# Patient Record
Sex: MALE | Race: WHITE | Employment: UNEMPLOYED
[De-identification: names, ages, dates, MRNs, and addresses within clinical notes are randomized per-mention and may not be internally consistent; named-entity substitution may affect disease eponyms.]

---

## 2017-01-13 ENCOUNTER — TELEPHONE (OUTPATIENT)
Dept: NEUROLOGY | Facility: CLINIC | Age: 62
End: 2017-01-13

## 2017-06-22 ENCOUNTER — OFFICE VISIT (OUTPATIENT)
Dept: NEUROLOGY | Age: 62
End: 2017-06-22
Payer: COMMERCIAL

## 2017-06-22 VITALS
HEIGHT: 69 IN | HEART RATE: 59 BPM | DIASTOLIC BLOOD PRESSURE: 79 MMHG | WEIGHT: 234 LBS | BODY MASS INDEX: 34.66 KG/M2 | SYSTOLIC BLOOD PRESSURE: 125 MMHG

## 2017-06-22 DIAGNOSIS — M79.2 PERIPHERAL NEUROPATHIC PAIN: Primary | ICD-10-CM

## 2017-06-22 PROCEDURE — 99214 OFFICE O/P EST MOD 30 MIN: CPT | Performed by: PSYCHIATRY & NEUROLOGY

## 2017-06-29 RX ORDER — PREGABALIN 100 MG/1
100 CAPSULE ORAL 3 TIMES DAILY
Qty: 270 CAPSULE | Refills: 3 | OUTPATIENT
Start: 2017-06-29 | End: 2018-01-17 | Stop reason: SDUPTHER

## 2017-09-25 RX ORDER — ROPINIROLE 0.25 MG/1
0.25 TABLET, FILM COATED ORAL NIGHTLY
Qty: 90 TABLET | Refills: 0 | Status: SHIPPED | OUTPATIENT
Start: 2017-09-25 | End: 2017-12-21 | Stop reason: SDUPTHER

## 2017-11-01 ENCOUNTER — HOSPITAL ENCOUNTER (OUTPATIENT)
Age: 62
Discharge: HOME | End: 2017-11-01
Payer: COMMERCIAL

## 2017-11-01 ENCOUNTER — HOSPITAL ENCOUNTER (OUTPATIENT)
Dept: GENERAL RADIOLOGY | Age: 62
Discharge: HOME | End: 2017-11-01
Payer: COMMERCIAL

## 2017-11-01 DIAGNOSIS — M25.552 PAIN IN JOINT INVOLVING LEFT PELVIC REGION AND THIGH: ICD-10-CM

## 2017-11-01 LAB
ALBUMIN SERPL-MCNC: 4.5 G/DL (ref 3.5–5.2)
ALBUMIN/GLOBULIN RATIO: ABNORMAL (ref 1–2.5)
ALP BLD-CCNC: 50 U/L (ref 40–129)
ALT SERPL-CCNC: 26 U/L (ref 5–41)
ANION GAP SERPL CALCULATED.3IONS-SCNC: 13 MMOL/L (ref 9–17)
AST SERPL-CCNC: 24 U/L
BILIRUB SERPL-MCNC: 0.55 MG/DL (ref 0.3–1.2)
BUN BLDV-MCNC: 14 MG/DL (ref 8–23)
BUN/CREAT BLD: 17 (ref 9–20)
CALCIUM SERPL-MCNC: 9.6 MG/DL (ref 8.6–10.4)
CHLORIDE BLD-SCNC: 100 MMOL/L (ref 98–107)
CHOLESTEROL/HDL RATIO: 3.7
CHOLESTEROL: 144 MG/DL
CO2: 25 MMOL/L (ref 20–31)
CREAT SERPL-MCNC: 0.82 MG/DL (ref 0.7–1.2)
GFR AFRICAN AMERICAN: >60 ML/MIN
GFR NON-AFRICAN AMERICAN: >60 ML/MIN
GFR SERPL CREATININE-BSD FRML MDRD: ABNORMAL ML/MIN/{1.73_M2}
GFR SERPL CREATININE-BSD FRML MDRD: ABNORMAL ML/MIN/{1.73_M2}
GLUCOSE BLD-MCNC: 102 MG/DL (ref 70–99)
HDLC SERPL-MCNC: 39 MG/DL
LDL CHOLESTEROL: 75 MG/DL (ref 0–130)
POTASSIUM SERPL-SCNC: 4.4 MMOL/L (ref 3.7–5.3)
PROSTATE SPECIFIC ANTIGEN: 1.97 UG/L
SODIUM BLD-SCNC: 138 MMOL/L (ref 135–144)
TOTAL PROTEIN: 6.8 G/DL (ref 6.4–8.3)
TRIGL SERPL-MCNC: 148 MG/DL
VLDLC SERPL CALC-MCNC: ABNORMAL MG/DL (ref 1–30)

## 2017-11-01 PROCEDURE — 73502 X-RAY EXAM HIP UNI 2-3 VIEWS: CPT

## 2017-11-01 PROCEDURE — 36415 COLL VENOUS BLD VENIPUNCTURE: CPT

## 2017-11-01 PROCEDURE — 80053 COMPREHEN METABOLIC PANEL: CPT

## 2017-11-01 PROCEDURE — 84153 ASSAY OF PSA TOTAL: CPT

## 2017-11-01 PROCEDURE — 80061 LIPID PANEL: CPT

## 2017-12-21 RX ORDER — ROPINIROLE 0.25 MG/1
0.25 TABLET, FILM COATED ORAL NIGHTLY
Qty: 90 TABLET | Refills: 0 | Status: SHIPPED | OUTPATIENT
Start: 2017-12-21 | End: 2018-01-10 | Stop reason: SDUPTHER

## 2018-01-10 ENCOUNTER — TELEPHONE (OUTPATIENT)
Dept: NEUROLOGY | Age: 63
End: 2018-01-10

## 2018-01-10 ENCOUNTER — OFFICE VISIT (OUTPATIENT)
Dept: NEUROLOGY | Age: 63
End: 2018-01-10
Payer: COMMERCIAL

## 2018-01-10 VITALS
HEART RATE: 92 BPM | HEIGHT: 70 IN | WEIGHT: 250.6 LBS | BODY MASS INDEX: 35.88 KG/M2 | DIASTOLIC BLOOD PRESSURE: 80 MMHG | SYSTOLIC BLOOD PRESSURE: 132 MMHG

## 2018-01-10 DIAGNOSIS — G62.9 NEUROPATHY: Primary | ICD-10-CM

## 2018-01-10 PROCEDURE — 99214 OFFICE O/P EST MOD 30 MIN: CPT | Performed by: NURSE PRACTITIONER

## 2018-01-10 RX ORDER — ROPINIROLE 0.25 MG/1
0.25 TABLET, FILM COATED ORAL NIGHTLY
Qty: 90 TABLET | Refills: 3 | Status: SHIPPED | OUTPATIENT
Start: 2018-01-10 | End: 2019-01-09 | Stop reason: SDUPTHER

## 2018-01-10 NOTE — LETTER
January 10, 2018     Cecilia Pereyra MD   [address]                         [phone] [facility]    Patient: Yaniv Nielsen  MR Number: [id number]  YOB: 1955  Date of Visit: 1/10/2018    Dear Dr. Cecilia Pereyra:        [facility]  [facility], [address], [address] [place] [address]  Dept: [phone]  Dept Fax: [phone]                            MD Pedro Shirley MD Elder Cower, MD Ahmed B. Rama Born, MD Smitty Budd, MD Gwendolyn Anger, CNP      1/10/2018    HPI:      Your patient, Yaniv Nielsen returns for continuing neurologic care. Patient is a 73-year-old man who is seen in the past by Noreen Bazzi for idiopathic peripheral neuropathy and restless leg syndrome. His last visit was on June 22, 2017. 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 There was also a right median neuropathy, indicating a right carpal tunnel syndrome. Previous testing also included TSH 2.32, vitamin B12 903,  and hemoglobin A1c 5.3. Patient also had a nerve fiber density test which was abnormal.  Patient is currently treated with Lyrica 100 mg 3 times daily and Requip 0.25 mg at bedtime. Patient returns today in follow-up for his peripheral neuropathy. He reports adequate control with his current dose of Lyrica as well as the Requip. Patient's symptoms typically are in his feet and ascended to his ankle and are described as painful numbness and tingling.     Past Medical History:   Diagnosis Date    Carpal tunnel syndrome     Hyperlipidemia     Hypertension     Neuropathy (HCC)     Osteoarthritis     left hip          Past Surgical History:   Procedure Laterality Date    CARPAL TUNNEL RELEASE  atenolol (TENORMIN) 25 MG tablet Take 25 mg by mouth daily      atorvastatin (LIPITOR) 10 MG tablet Take 10 mg by mouth daily. No current facility-administered medications for this visit. PHYSICAL EXAMINATION       There were no vitals filed for this visit. .                                                                                                    General Appearance:  Alert, cooperative, no signs of distress, appears stated age   Head:  Normocephalic, no signs of trauma   Eyes:  Conjunctiva/corneas clear;  eyelids intact   Ears:  Normal external ear and canals   Nose: Nares normal, mucosa normal, no drainage    Throat: Lips and tongue normal; teeth normal;  gums normal   Neck: Supple, intact flexion, extension and rotation;   trachea midline;  no adenopathy;   thyroid: not enlarged;   no carotid pulse abnormality   Back:   Symmetric, no curvature, ROM adequate   Lungs:   Respirations unlabored   Heart:  Regular rate and rhythm           Extremities: Extremities normal, no cyanosis, no edema   Pulses: Symmetric over head and neck   Skin: Skin color, texture normal, no rashes, no lesions                                             NEUROLOGIC EXAMINATION    Neurologic Exam     Mental Status   Oriented to person, place, and time. Attention: normal.   Speech: speech is normal   Level of consciousness: alert  Normal comprehension. Cranial Nerves     CN II   Visual fields full to confrontation. CN III, IV, VI   Pupils are equal, round, and reactive to light. Extraocular motions are normal.     CN V   Facial sensation intact. CN VII   Facial expression full, symmetric.      CN VIII   CN VIII normal.     CN IX, X   CN IX normal.     CN XI   CN XI normal.     CN XII   CN XII normal.     Motor Exam   Muscle bulk: normal  Overall muscle tone: normal  Right arm tone: normal  Left arm tone: normal

## 2018-01-10 NOTE — PROGRESS NOTES
Substance Use Topics    Smoking status: Never Smoker    Smokeless tobacco: Never Used    Alcohol use 0.0 oz/week      Comment: rarely                               REVIEW OF SYSTEMS    CONSTITUTIONAL Weight: absent, Appetite: absent, Fatigue: absent      HEENT Ears: ringing, Visual disturbance: absent   RESPIRATORY Shortness of breath: absent, Cough: absent   CARDIOVASCULAR Chest pain: absent, Leg swelling :absent      GI Constipation: absent, Diarrhea: absent, Swallowing change: absent       Urinary frequency: absent, Urinary urgency: absent, Urinary incontinence: absent   MUSCULOSKELETAL Neck pain: absent, Back pain: absent, Stiffness: absent, Muscle pain: absent, Joint pain: present Restless legs: absent   DERMATOLOGIC Hair loss: absent, Skin changes: absent   NEUROLOGIC Memory loss: absent, Confusion: absent, Seizures: absent Trouble walking or imbalance: present, Dizziness: absent, Weakness: absent, Numbness: absent Tremor: absent, Spasm: absent, Speech difficulty: absent, Headache: absent, Light sensitivity: absent   PSYCHIATRIC Anxiety: absent, Hallucination: absent, Mood disorder: absent   HEMATOLOGIC Abnormal bleeding: absent, Anemia: absent, Clotting disorder: absent, Lymph gland changes: absent           No Known Allergies        Current Outpatient Prescriptions   Medication Sig Dispense Refill    rOPINIRole (REQUIP) 0.25 MG tablet TAKE 1 TABLET BY MOUTH NIGHTLY 90 tablet 0    pregabalin (LYRICA) 100 MG capsule Take 1 capsule by mouth 3 times daily 270 capsule 3    HYDROcodone-acetaminophen (NORCO) 5-325 MG per tablet Take 1 tablet by mouth as needed      niacin 500 MG CR capsule Take 500 mg by mouth nightly      atenolol (TENORMIN) 25 MG tablet Take 25 mg by mouth daily      atorvastatin (LIPITOR) 10 MG tablet Take 10 mg by mouth daily. No current facility-administered medications for this visit.                                          PHYSICAL EXAMINATION       There were no vitals filed for this visit. .                                                                                                    General Appearance:  Alert, cooperative, no signs of distress, appears stated age   Head:  Normocephalic, no signs of trauma   Eyes:  Conjunctiva/corneas clear;  eyelids intact   Ears:  Normal external ear and canals   Nose: Nares normal, mucosa normal, no drainage    Throat: Lips and tongue normal; teeth normal;  gums normal   Neck: Supple, intact flexion, extension and rotation;   trachea midline;  no adenopathy;   thyroid: not enlarged;   no carotid pulse abnormality   Back:   Symmetric, no curvature, ROM adequate   Lungs:   Respirations unlabored   Heart:  Regular rate and rhythm           Extremities: Extremities normal, no cyanosis, no edema   Pulses: Symmetric over head and neck   Skin: Skin color, texture normal, no rashes, no lesions                                             NEUROLOGIC EXAMINATION    Neurologic Exam     Mental Status   Oriented to person, place, and time. Attention: normal.   Speech: speech is normal   Level of consciousness: alert  Normal comprehension. Cranial Nerves     CN II   Visual fields full to confrontation. CN III, IV, VI   Pupils are equal, round, and reactive to light. Extraocular motions are normal.     CN V   Facial sensation intact. CN VII   Facial expression full, symmetric. CN VIII   CN VIII normal.     CN IX, X   CN IX normal.     CN XI   CN XI normal.     CN XII   CN XII normal.     Motor Exam   Muscle bulk: normal  Overall muscle tone: normal  Right arm tone: normal  Left arm tone: normal  Right arm pronator drift: absent  Left arm pronator drift: absent  Right leg tone: normal  Left leg tone: normal    Strength   Strength 5/5 throughout.      Sensory Exam   Right arm light touch: normal  Left arm light touch: normal  Right leg light touch: decreased from ankle  Left leg light touch:

## 2018-01-17 DIAGNOSIS — G62.9 NEUROPATHY: Primary | ICD-10-CM

## 2018-01-17 RX ORDER — PREGABALIN 100 MG/1
100 CAPSULE ORAL 3 TIMES DAILY
Qty: 270 CAPSULE | Refills: 1 | Status: SHIPPED | OUTPATIENT
Start: 2018-01-17 | End: 2018-01-22 | Stop reason: SDUPTHER

## 2018-01-22 DIAGNOSIS — G62.9 NEUROPATHY: ICD-10-CM

## 2018-01-22 RX ORDER — PREGABALIN 100 MG/1
100 CAPSULE ORAL 3 TIMES DAILY
Qty: 270 CAPSULE | Refills: 1 | Status: SHIPPED | OUTPATIENT
Start: 2018-01-22 | End: 2018-05-31 | Stop reason: SDUPTHER

## 2018-02-21 ENCOUNTER — TELEPHONE (OUTPATIENT)
Dept: NEUROLOGY | Age: 63
End: 2018-02-21

## 2018-03-28 RX ORDER — ROPINIROLE 0.25 MG/1
0.25 TABLET, FILM COATED ORAL NIGHTLY
Qty: 90 TABLET | Refills: 3 | Status: CANCELLED | OUTPATIENT
Start: 2018-03-28 | End: 2018-06-26

## 2018-03-28 NOTE — TELEPHONE ENCOUNTER
We received a faxed request from [facility] pharmacy in [facility], for a refill on Shon's Requip. . This is not the pharmacy listed in his chart. A call was placed to Shon to check on this. He told me that he no longer uses this pharmacy, He has changed to [facility], which is the one in his chart. His next appointment is 7/10/2018.

## 2018-05-29 DIAGNOSIS — G62.9 NEUROPATHY: ICD-10-CM

## 2018-05-31 RX ORDER — PREGABALIN 100 MG/1
100 CAPSULE ORAL 3 TIMES DAILY
Qty: 270 CAPSULE | Refills: 1 | Status: SHIPPED | OUTPATIENT
Start: 2018-05-31 | End: 2018-12-07 | Stop reason: SDUPTHER

## 2018-07-10 ENCOUNTER — OFFICE VISIT (OUTPATIENT)
Dept: NEUROLOGY | Age: 63
End: 2018-07-10
Payer: COMMERCIAL

## 2018-07-10 VITALS
HEART RATE: 67 BPM | SYSTOLIC BLOOD PRESSURE: 130 MMHG | WEIGHT: 243.3 LBS | DIASTOLIC BLOOD PRESSURE: 82 MMHG | BODY MASS INDEX: 34.83 KG/M2 | HEIGHT: 70 IN

## 2018-07-10 DIAGNOSIS — G25.81 RESTLESS LEG SYNDROME: ICD-10-CM

## 2018-07-10 DIAGNOSIS — G62.9 NEUROPATHY: Primary | ICD-10-CM

## 2018-07-10 PROCEDURE — 99214 OFFICE O/P EST MOD 30 MIN: CPT | Performed by: NURSE PRACTITIONER

## 2018-07-10 NOTE — PROGRESS NOTES
per tablet Take 1 tablet by mouth as needed      niacin 500 MG CR capsule Take 500 mg by mouth nightly      atenolol (TENORMIN) 25 MG tablet Take 25 mg by mouth daily      atorvastatin (LIPITOR) 10 MG tablet Take 10 mg by mouth daily. No current facility-administered medications for this visit. PHYSICAL EXAMINATION       /82 (Site: Left Arm, Position: Sitting) Comment: retake  Pulse 67   Ht 5' 10\" (1.778 m)   Wt 243 lb 4.8 oz (110.4 kg)   BMI 34.91 kg/m²                                             . General Appearance:  Alert, cooperative, no signs of distress, appears stated age   Head:  Normocephalic, no signs of trauma   Eyes:  Conjunctiva/corneas clear;  eyelids intact   Ears:  Normal external ear and canals   Nose: Nares normal, mucosa normal, no drainage    Throat: Lips and tongue normal; teeth normal;  gums normal   Neck: Supple, intact flexion, extension and rotation;   trachea midline;  no adenopathy;   thyroid: not enlarged;   no carotid pulse abnormality   Back:   Symmetric, no curvature, ROM adequate   Lungs:   Respirations unlabored   Heart:  Regular rate and rhythm           Extremities: Extremities normal, no cyanosis, no edema   Pulses: Symmetric over head and neck   Skin: Skin color, texture normal, no rashes, no lesions                                             NEUROLOGIC EXAMINATION    Neurologic Exam     Mental Status   Oriented to person, place, and time. Attention: normal.   Speech: speech is normal   Level of consciousness: alert  Normal comprehension. Cranial Nerves     CN II   Visual fields full to confrontation. CN III, IV, VI   Pupils are equal, round, and reactive to light. Extraocular motions are normal.     CN V   Facial sensation intact. CN VII   Facial expression full, symmetric.      CN VIII   CN VIII normal. CN IX, X   CN IX normal.     CN XI   CN XI normal.     CN XII   CN XII normal.     Motor Exam   Muscle bulk: normal  Overall muscle tone: normal  Right arm tone: normal  Left arm tone: normal  Right arm pronator drift: absent  Left arm pronator drift: absent  Right leg tone: normal  Left leg tone: normal    Strength   Strength 5/5 throughout. Sensory Exam   Right leg light touch: decreased from knee  Left leg light touch: decreased from knee  Right leg vibration: decreased from knee  Left leg vibration: decreased from knee  Right leg pinprick: decreased from knee  Left leg pinprick: decreased from knee    Gait, Coordination, and Reflexes     Gait  Gait: normal    Coordination   Finger to nose coordination: normal    Tremor   Resting tremor: absent    Reflexes   Right brachioradialis: 2+  Left brachioradialis: 2+  Right biceps: 2+  Left biceps: 2+  Right triceps: 2+  Left triceps: 2+  Right patellar: 0  Left patellar: 0  Right achilles: 0  Left achilles: 0            ASSESSMENT/PLAN:       In summary, your patient, Santa Book exhibits the following, with associated plan:    1. Idiopathic axonal sensorimotor peripheral neuropathy  1. Continue Lyrica 100 mg 3 times daily  2. Patient was advised that if the pain in his right foot is persistent, that we could either increase his Lyrica, we'll pursue further diagnostic studies such as an EMG/NCV study of his right leg. 3. He will otherwise return in 6 months  2. Restless leg syndrome  1.  Continue Requip 0.5 mg at bedtime daily            Signed: Zev German CNP      *Please note that portions of this note were completed with a voice recognition program.  Although every effort was made to insure the accuracy of this automated transcription, some errors in transcription may have occurred, occasionally words and are mis-transcribed

## 2018-09-12 ENCOUNTER — TELEPHONE (OUTPATIENT)
Dept: NEUROLOGY | Age: 63
End: 2018-09-12

## 2018-12-07 DIAGNOSIS — G62.9 NEUROPATHY: ICD-10-CM

## 2018-12-07 NOTE — TELEPHONE ENCOUNTER
Kenia Angeles called in. He is down to 4 week supply of Lyrica that he gets through pt. assistance. He asked if we can send them a RX. I told him we can try. I see his approval for pt. assistance is good until 12/31/18. He will need ot fill out a new form for after Jan 1. I told him we can mail it out to him because he doesn't have a printer.

## 2018-12-10 RX ORDER — PREGABALIN 100 MG/1
100 CAPSULE ORAL 3 TIMES DAILY
Qty: 270 CAPSULE | Refills: 1 | Status: SHIPPED | OUTPATIENT
Start: 2018-12-10 | End: 2019-06-05 | Stop reason: SDUPTHER

## 2019-01-09 ENCOUNTER — OFFICE VISIT (OUTPATIENT)
Dept: NEUROLOGY | Age: 64
End: 2019-01-09
Payer: COMMERCIAL

## 2019-01-09 VITALS
BODY MASS INDEX: 39.25 KG/M2 | DIASTOLIC BLOOD PRESSURE: 80 MMHG | HEART RATE: 78 BPM | SYSTOLIC BLOOD PRESSURE: 132 MMHG | WEIGHT: 265 LBS | HEIGHT: 69 IN

## 2019-01-09 DIAGNOSIS — G25.81 RESTLESS LEG SYNDROME: ICD-10-CM

## 2019-01-09 DIAGNOSIS — G62.9 NEUROPATHY: Primary | ICD-10-CM

## 2019-01-09 PROCEDURE — 99214 OFFICE O/P EST MOD 30 MIN: CPT | Performed by: NURSE PRACTITIONER

## 2019-01-09 RX ORDER — TIZANIDINE 2 MG/1
2 TABLET ORAL NIGHTLY PRN
Qty: 30 TABLET | Refills: 5 | Status: SHIPPED | OUTPATIENT
Start: 2019-01-09 | End: 2019-07-09 | Stop reason: SDUPTHER

## 2019-01-09 RX ORDER — ROPINIROLE 0.25 MG/1
0.25 TABLET, FILM COATED ORAL NIGHTLY
Qty: 90 TABLET | Refills: 3 | Status: SHIPPED | OUTPATIENT
Start: 2019-01-09 | End: 2019-07-09 | Stop reason: SDUPTHER

## 2019-06-05 DIAGNOSIS — G62.9 NEUROPATHY: ICD-10-CM

## 2019-06-05 RX ORDER — PREGABALIN 100 MG/1
100 CAPSULE ORAL 3 TIMES DAILY
Qty: 270 CAPSULE | Refills: 1 | Status: SHIPPED | OUTPATIENT
Start: 2019-06-05 | End: 2019-12-23 | Stop reason: SDUPTHER

## 2019-07-09 ENCOUNTER — OFFICE VISIT (OUTPATIENT)
Dept: NEUROLOGY | Age: 64
End: 2019-07-09
Payer: COMMERCIAL

## 2019-07-09 VITALS
HEIGHT: 70 IN | BODY MASS INDEX: 36.13 KG/M2 | HEART RATE: 71 BPM | DIASTOLIC BLOOD PRESSURE: 74 MMHG | WEIGHT: 252.4 LBS | SYSTOLIC BLOOD PRESSURE: 118 MMHG

## 2019-07-09 DIAGNOSIS — G25.81 RESTLESS LEG SYNDROME: ICD-10-CM

## 2019-07-09 DIAGNOSIS — G62.9 NEUROPATHY: ICD-10-CM

## 2019-07-09 PROCEDURE — 99214 OFFICE O/P EST MOD 30 MIN: CPT | Performed by: NURSE PRACTITIONER

## 2019-07-09 RX ORDER — TIZANIDINE 4 MG/1
4 TABLET ORAL NIGHTLY PRN
Qty: 90 TABLET | Refills: 5 | Status: SHIPPED | OUTPATIENT
Start: 2019-07-09 | End: 2020-01-09 | Stop reason: SDUPTHER

## 2019-07-09 RX ORDER — ROPINIROLE 0.25 MG/1
0.25 TABLET, FILM COATED ORAL NIGHTLY
Qty: 90 TABLET | Refills: 3 | Status: SHIPPED | OUTPATIENT
Start: 2019-07-09 | End: 2020-01-09

## 2019-07-09 NOTE — PROGRESS NOTES
clear;  eyelids intact   Ears:  Normal external ear and canals   Nose: Nares normal, mucosa normal, no drainage    Throat: Lips and tongue normal; teeth normal;  gums normal   Neck: Supple, intact flexion, extension and rotation;   trachea midline;  no adenopathy;   thyroid: not enlarged;   no carotid pulse abnormality   Back:   Symmetric, no curvature, ROM adequate   Lungs:   Respirations unlabored   Heart:  Regular rate and rhythm           Extremities: Extremities normal, no cyanosis, no edema   Pulses: Symmetric over head and neck   Skin: Skin color, texture normal, no rashes, no lesions                                     NEUROLOGIC EXAMINATION    Neurologic Exam  Mental status    Alert and oriented x 3; intact memory with no confusion, speech or language problems; no hallucinations or delusions  Fund of information appropriate for level of education    Cranial nerves    II - visual fields intact to confrontation bilaterally  III, IV, VI - extra-ocular muscles full: no pupillary defect; no GLORY, no nystagmus, no ptosis   V - normal facial sensation                                                               VII - normal facial symmetry                                                             VIII - intact hearing                                                                             IX, X - symmetrical palate                                                                  XI - symmetrical shoulder shrug                                                       XII - tongue midline without atrophy or fasciculation      Motor function  Normal muscle bulk and tone; strength 5/5 on all 4 extremities, no pronator drift      Sensory function Diminished to light touch, pinprick, vibration, proprioception in the lower extremities to the ankle      Cerebellar Intact fine motor movement. No involuntary movements or tremors.  No ataxia or dysmetria on finger to nose or heel to shin testing      Reflex function DTR 1+

## 2019-12-19 DIAGNOSIS — G62.9 NEUROPATHY: ICD-10-CM

## 2019-12-23 RX ORDER — PREGABALIN 100 MG/1
100 CAPSULE ORAL 3 TIMES DAILY
Qty: 270 CAPSULE | Refills: 1 | Status: SHIPPED | OUTPATIENT
Start: 2019-12-23 | End: 2020-01-09 | Stop reason: SDUPTHER

## 2020-01-09 ENCOUNTER — OFFICE VISIT (OUTPATIENT)
Dept: NEUROLOGY | Age: 65
End: 2020-01-09
Payer: COMMERCIAL

## 2020-01-09 VITALS
DIASTOLIC BLOOD PRESSURE: 90 MMHG | WEIGHT: 250.8 LBS | SYSTOLIC BLOOD PRESSURE: 154 MMHG | BODY MASS INDEX: 35.9 KG/M2 | HEIGHT: 70 IN | HEART RATE: 72 BPM

## 2020-01-09 PROCEDURE — 99214 OFFICE O/P EST MOD 30 MIN: CPT | Performed by: NURSE PRACTITIONER

## 2020-01-09 RX ORDER — TIZANIDINE 4 MG/1
4 TABLET ORAL NIGHTLY PRN
Qty: 90 TABLET | Refills: 5 | Status: SHIPPED | OUTPATIENT
Start: 2020-01-09 | End: 2020-07-09 | Stop reason: SDUPTHER

## 2020-01-09 NOTE — TELEPHONE ENCOUNTER
Pt saw Dora Ron in follow up today. She wants to increase his Lyrica to 100 mg QID. He gets his script from the [facility] program, they are located in [place], and will not accept scripts from midlevel practitioners. Please approve this script.

## 2020-01-09 NOTE — PROGRESS NOTES
[facility]            [address]          [place] [facility]          Dept: [phone]          Dept Fax: [phone]        MD Evan Alexandre MD Ahmed B. Sandie Clap, MD Suzie Ha, MD Anner Ginsberg, MD Evone Alcide, CNP            1/9/2020      HISTORY OF PRESENT ILLNESS:       I had the pleasure of seeing Inga Lord, who returns for continuing neurologic care. Patient is a 72-year-old man who was seen last on July 9, 2019 for treatment of idiopathic peripheral neuropathy. He is also treated with less restless leg syndrome. Patient had a previous EMG/NCV study showing a primary axonal sensorimotor neuropathy in his bilateral lower extremities. He also has a right median neuropathy, indicating a right carpal tunnel syndrome. He has a history of a right carpal tunnel release which is improved his sensory symptoms on the first 3 digits of his right hand, but he continues to have some numbness associated with his previous problem. Previous testing includes vitamin B12 903, TSH 2.3, and hemoglobin A1c 5.3. He did have a nerve fiber density test which was abnormal.  For treatment of his neuropathy, he takes Lyrica 100 mg 3 times daily. He will also take Zanaflex 4 mg at bedtime to relieve any muscle cramps. The patient is also treated for restless leg syndrome. He was taking Requip 0.5 mg at bedtime daily. Patient is here today for reevaluation. He continues to take Lyrica 100 mg 3 times daily but admits that especially during the winter months when it is cold he is having increased pain. And has a tingling sensation which is painful in his feet bilaterally up to his mid ankle. The pain is constant. When he takes the Lyrica, the intensity is 4/10, but when he does not have Lyrica or when the Lyrica wears off, it is an 8/10.   It can be worse at night when he feels cramping. To relieve the cramping he takes tizanidine 4 mg. The Requip did not help his discomfort, so he stopped taking it. There is nothing else that helps his discomfort but is requesting information regarding CBD oil to be rubbed on his feet. PAST MEDICAL HISTORY:         Diagnosis Date    Carpal tunnel syndrome     Hyperlipidemia     Hypertension     Neuropathy     Osteoarthritis     left hip         PAST SURGICAL HISTORY:         Procedure Laterality Date    CARPAL TUNNEL RELEASE      COLONOSCOPY      FINGER SURGERY      x2    INGUINAL HERNIA REPAIR      ROTATOR CUFF REPAIR      left    TOTAL HIP ARTHROPLASTY Left         SOCIAL HISTORY:     Social History     Socioeconomic History    Marital status: Single     Spouse name: Not on file    Number of children: Not on file    Years of education: Not on file    Highest education level: Not on file   Occupational History    Not on file   Social Needs    Financial resource strain: Not on file    Food insecurity:     Worry: Not on file     Inability: Not on file    Transportation needs:     Medical: Not on file     Non-medical: Not on file   Tobacco Use    Smoking status: Never Smoker    Smokeless tobacco: Never Used   Substance and Sexual Activity    Alcohol use:  Yes     Alcohol/week: 0.0 standard drinks     Comment: rarely    Drug use: No    Sexual activity: Not on file   Lifestyle    Physical activity:     Days per week: Not on file     Minutes per session: Not on file    Stress: Not on file   Relationships    Social connections:     Talks on phone: Not on file     Gets together: Not on file     Attends Taoist service: Not on file     Active member of club or organization: Not on file     Attends meetings of clubs or organizations: Not on file     Relationship status: Not on file    Intimate partner violence:     Fear of current or ex partner: Not on file     Emotionally abused: Not on file     Physically visit.                                            .                                                                                                    General Appearance:  Alert, cooperative, no signs of distress, appears stated age   Head:  Normocephalic, no signs of trauma   Eyes:  Conjunctiva/corneas clear;  eyelids intact   Ears:  Normal external ear and canals   Nose: Nares normal, mucosa normal, no drainage    Throat: Lips and tongue normal; teeth normal;  gums normal   Neck: Supple, intact flexion, extension and rotation;   trachea midline;  no adenopathy;   thyroid: not enlarged;   no carotid pulse abnormality   Back:   Symmetric, no curvature, ROM adequate   Lungs:   Respirations unlabored   Heart:  Regular rate and rhythm           Extremities: Extremities normal, no cyanosis, no edema   Pulses: Symmetric over head and neck   Skin: Skin color, texture normal, no rashes, no lesions                                     NEUROLOGIC EXAMINATION    Neurologic Exam  Mental status    Alert and oriented x 3; intact memory with no confusion, speech or language problems; no hallucinations or delusions  Fund of information appropriate for level of education    Cranial nerves    II - visual fields intact to confrontation bilaterally  III, IV, VI - extra-ocular muscles full: no pupillary defect; no GLORY, no nystagmus, no ptosis   V - normal facial sensation                                                               VII - normal facial symmetry                                                             VIII - intact hearing                                                                             IX, X - symmetrical palate                                                                  XI - symmetrical shoulder shrug                                                       XII - tongue midline without atrophy or fasciculation      Motor function  Normal muscle bulk and tone; strength 5/5 on all 4 extremities, no pronator drift      Sensory function Intact to light touch, pinprick, vibration, proprioception on all 4 extremities      Cerebellar Intact fine motor movement. No involuntary movements or tremors. No ataxia or dysmetria on finger to nose or heel to shin testing      Reflex function DTR 2+ on bilateral UE and LE, symmetric. Negative Babinski      Gait                   normal base and arm swing                  ASSESSMENT AND PLAN:           In summary, your patient, Petar Ellison exhibits the following, with associated plan:    1. Idiopathic peripheral neuropathy  1. Increase Lyrica to 100 mg 4 times daily. The patient will take the medication 3 times daily and on his bad days, increase the dosage to 4 times daily. He was advised that having a pain level of 4/10 constantly and periodic 8/10 would respond better to a higher dose of Lyrica. 2. Restless leg syndrome  1. Continues tizanidine 4 mg at bedtime daily  2.  Patient to return in 6 months for reevaluation            Signed: Patricia Waters CNP      *Please note that portions of this note were completed with a voice recognition program.  Although every effort was made to insure the accuracy of this automated transcription, some errors in transcription may have occurred, occasionally words and are mis-transcribed

## 2020-01-10 RX ORDER — PREGABALIN 100 MG/1
100 CAPSULE ORAL 4 TIMES DAILY
Qty: 360 CAPSULE | Refills: 1 | Status: SHIPPED | OUTPATIENT
Start: 2020-01-10 | End: 2020-07-09

## 2020-01-24 ENCOUNTER — TELEPHONE (OUTPATIENT)
Dept: NEUROLOGY | Age: 65
End: 2020-01-24

## 2020-01-24 NOTE — TELEPHONE ENCOUNTER
Darwin Browning called looking for an update on the paperwork needed for the [employer].    Please contact him regarding his paperwork.     Thank you

## 2020-07-09 ENCOUNTER — OFFICE VISIT (OUTPATIENT)
Dept: NEUROLOGY | Age: 65
End: 2020-07-09
Payer: COMMERCIAL

## 2020-07-09 VITALS
BODY MASS INDEX: 35.93 KG/M2 | RESPIRATION RATE: 20 BRPM | WEIGHT: 251 LBS | HEART RATE: 79 BPM | TEMPERATURE: 98.1 F | HEIGHT: 70 IN | DIASTOLIC BLOOD PRESSURE: 76 MMHG | SYSTOLIC BLOOD PRESSURE: 129 MMHG

## 2020-07-09 PROCEDURE — 99214 OFFICE O/P EST MOD 30 MIN: CPT | Performed by: NURSE PRACTITIONER

## 2020-07-09 RX ORDER — TIZANIDINE 4 MG/1
4 TABLET ORAL NIGHTLY PRN
Qty: 90 TABLET | Refills: 3 | Status: SHIPPED | OUTPATIENT
Start: 2020-07-09 | End: 2021-01-12 | Stop reason: ALTCHOICE

## 2020-07-09 RX ORDER — PREGABALIN 100 MG/1
100 CAPSULE ORAL 3 TIMES DAILY
Qty: 270 CAPSULE | Refills: 3 | Status: SHIPPED | OUTPATIENT
Start: 2020-07-09 | End: 2021-03-15 | Stop reason: SDUPTHER

## 2020-07-09 NOTE — PROGRESS NOTES
[facility]            [address]          [facility], [address] [facility]          Dept: [phone]          Dept Fax: [phone]        MD Hazel Strickland MD Ahmed B. Leafy Barnes, MD Leretha Conte, MD Mikle Ouch, MD Curly Lebanon, CNP            7/9/2020      HISTORY OF PRESENT ILLNESS:       I had the pleasure of seeing Ernesto Calvo, who returns for continuing neurologic care. Patient is a 37-year-old man who was seen last on November 9, 2020 for treatment of idiopathic peripheral neuropathy. The patient also has restless leg syndrome. A previous EMG/NCV study showed a primary axonal sensorimotor peripheral neuropathy in his bilateral lower extremities. He also has a right median neuropathy, indicating a right carpal tunnel syndrome. He has a history of a right carpal tunnel release which is improved his sensory symptoms on the first 3 digits of his right hand, but he continues to have some numbness associated with his previous problem. Previous testing includes vitamin B12 903, TSH 2.3, and hemoglobin A1c 5.3. He did have a nerve fiber density test which was abnormal.  For treatment of his neuropathy, he takes Lyrica 100 mg 3 times daily. He will also take Zanaflex 4 mg at bedtime to relieve any muscle cramps. The patient is also treated for restless leg syndrome. He was taking Requip 0.5 mg at bedtime daily. The patient is here today for reevaluation. He has not had his Lyrica since January because he is on the patient assistance program with the company that makes Lyrica and paperwork was not sufficiently completed therefore he did not receive the medication. His pain is accentuated in his bilateral lower extremities. He feels a 5/10 pain constantly. It is aggravated by walking. It is relieved by rest.  It is aggravated also by cold temperatures.   He used CBD oil on his legs for short period of time without relief. Currently he is using Tylenol. He stopped using Requip, and takes Zanaflex 4 mg at bedtime daily for muscle cramps and a feeling of restless legs. Prior testing reviewed:    vitamin B12 903, TSH 2.3, and hemoglobin A1c 5.3. PAST MEDICAL HISTORY:         Diagnosis Date    Carpal tunnel syndrome     Hyperlipidemia     Hypertension     Neuropathy     Osteoarthritis     left hip         PAST SURGICAL HISTORY:         Procedure Laterality Date    CARPAL TUNNEL RELEASE      COLONOSCOPY      FINGER SURGERY      x2    INGUINAL HERNIA REPAIR      ROTATOR CUFF REPAIR      left    TOTAL HIP ARTHROPLASTY Left         SOCIAL HISTORY:     Social History     Socioeconomic History    Marital status: Single     Spouse name: Not on file    Number of children: Not on file    Years of education: Not on file    Highest education level: Not on file   Occupational History    Not on file   Social Needs    Financial resource strain: Not on file    Food insecurity     Worry: Not on file     Inability: Not on file    Transportation needs     Medical: Not on file     Non-medical: Not on file   Tobacco Use    Smoking status: Never Smoker    Smokeless tobacco: Never Used   Substance and Sexual Activity    Alcohol use:  Yes     Alcohol/week: 0.0 standard drinks     Comment: rarely    Drug use: No    Sexual activity: Not on file   Lifestyle    Physical activity     Days per week: Not on file     Minutes per session: Not on file    Stress: Not on file   Relationships    Social connections     Talks on phone: Not on file     Gets together: Not on file     Attends Yarsani service: Not on file     Active member of club or organization: Not on file     Attends meetings of clubs or organizations: Not on file     Relationship status: Not on file    Intimate partner violence     Fear of current or ex partner: Not on file     Emotionally abused: Not on file     Physically abused: Not on file     Forced sexual activity: Not on file   Other Topics Concern    Not on file   Social History Narrative    Not on file       CURRENT MEDICATIONS:     Current Outpatient Medications   Medication Sig Dispense Refill    pregabalin (LYRICA) 100 MG capsule Take 1 capsule by mouth 3 times daily for 90 days. 270 capsule 3    tiZANidine (ZANAFLEX) 4 MG tablet Take 1 tablet by mouth nightly as needed (muscle spasms) 90 tablet 3    niacin 500 MG CR capsule Take 500 mg by mouth nightly      atenolol (TENORMIN) 25 MG tablet Take 25 mg by mouth daily       No current facility-administered medications for this visit. ALLERGIES:   No Known Allergies                              REVIEW OF SYSTEMS       All items selected indicate a positive finding. Those items not selected are negative. Constitutional [] Weight loss/gain   [] Fatigue  [] Fever/Chills   HEENT [] Hearing Loss  [] Visual Disturbance  [] Tinnitus  [] Eye pain   Respiratory [] Shortness of Breath  [] Cough  [] Snoring   Cardiovascular [] Chest Pain  [] Palpitations  [] Lightheaded   GI [] Constipation  [] Diarrhea  [] Swallowing change    [] Urinary Frequency  [] Urinary Urgency   Musculoskeletal [] Neck pain  [] Back pain  [x] Muscle pain  [x] Restless legs   Dermatologic [] Skin changes   Neurologic [] Memory loss/confusion  [] Seizures  [x] Trouble walking or imbalance  [] Dizziness  [] Weakness  [x] Numbness  [] Tremors  [] Speech Difficulty  [] Headaches  [] Light Sensitivity  [] Sound Sensitivity   Endocrinology []Excessive thirst  []Excessive hunger   Psychiatric [] Anxiety/Depression  [] Hallucination   Allergy/immunology []Hives/environmental allergies   Hematologic/lymph [] Abnormal bleeding  [] Abnormal bruising     PHYSICAL EXAMINATION:       Vitals:    07/09/20 0940   BP: 129/76   Pulse: 79   Resp: 20   Temp: 98.1 °F (36.7 °C)                                              . General Appearance:  Alert, cooperative, no signs of distress, appears stated age   Head:  Normocephalic, no signs of trauma   Eyes:  Conjunctiva/corneas clear;  eyelids intact   Ears:  Normal external ear and canals   Nose: Nares normal, mucosa normal, no drainage    Throat: Lips and tongue normal; teeth normal;  gums normal   Neck: Supple, intact flexion, extension and rotation;   trachea midline;  no adenopathy;   thyroid: not enlarged;   no carotid pulse abnormality   Back:   Symmetric, no curvature, ROM adequate   Lungs:   Respirations unlabored   Heart:  Regular rate and rhythm           Extremities: Extremities normal, no cyanosis, no edema   Pulses: Symmetric over head and neck   Skin: Skin color, texture normal, no rashes, no lesions                                     NEUROLOGIC EXAMINATION    Neurologic Exam  Mental status    Alert and oriented x 3; intact memory with no confusion, speech or language problems; no hallucinations or delusions  Fund of information appropriate for level of education    Cranial nerves    II - visual fields intact to confrontation bilaterally  III, IV, VI - extra-ocular muscles full: no pupillary defect; no GLORY, no nystagmus, no ptosis   V - normal facial sensation                                                               VII - normal facial symmetry                                                             VIII - intact hearing                                                                             IX, X - symmetrical palate                                                                  XI - symmetrical shoulder shrug                                                       XII - tongue midline without atrophy or fasciculation      Motor function  Normal muscle bulk and tone; strength 5/5 on all 4 extremities, no pronator drift      Sensory function Intact to light touch, pinprick, vibration, proprioception on all 4 extremities      Cerebellar Intact fine motor movement. No involuntary movements or tremors. No ataxia or dysmetria on finger to nose or heel to shin testing      Reflex function DTR 2+ on bilateral UE and LE, symmetric. Negative Babinski      Gait                   normal base and arm swing                  ASSESSMENT AND PLAN:           In summary, your patient, Dayanna Cook exhibits the following, with associated plan:    1. Idiopathic peripheral neuropathy restless leg syndrome  1. Lyrica 100 mg 3 times daily. The patient will bring paperwork to the office. A paper prescription was printed to be sent in with his information. 2. Restless leg syndrome  1. Continue tizanidine 4 mg at bedtime daily  2.  Return in follow-up in 6 months            Signed: Jairo Valle CNP      *Please note that portions of this note were completed with a voice recognition program.  Although every effort was made to insure the accuracy of this automated transcription, some errors in transcription may have occurred, occasionally words and are mis-transcribed

## 2021-01-12 ENCOUNTER — OFFICE VISIT (OUTPATIENT)
Dept: NEUROLOGY | Age: 66
End: 2021-01-12
Payer: COMMERCIAL

## 2021-01-12 VITALS
SYSTOLIC BLOOD PRESSURE: 156 MMHG | HEART RATE: 71 BPM | DIASTOLIC BLOOD PRESSURE: 85 MMHG | HEIGHT: 70 IN | BODY MASS INDEX: 35.79 KG/M2 | WEIGHT: 250 LBS | TEMPERATURE: 98.1 F

## 2021-01-12 DIAGNOSIS — G56.01 CARPAL TUNNEL SYNDROME OF RIGHT WRIST: ICD-10-CM

## 2021-01-12 DIAGNOSIS — G62.9 NEUROPATHY: ICD-10-CM

## 2021-01-12 DIAGNOSIS — G25.81 RESTLESS LEG SYNDROME: Primary | ICD-10-CM

## 2021-01-12 PROCEDURE — 99214 OFFICE O/P EST MOD 30 MIN: CPT | Performed by: NURSE PRACTITIONER

## 2021-01-12 RX ORDER — METOPROLOL SUCCINATE 25 MG/1
TABLET, EXTENDED RELEASE ORAL
COMMUNITY
Start: 2020-12-09

## 2021-01-12 NOTE — PROGRESS NOTES
[facility]            [address]          [place], [address] [facility]          Dept: [phone]          Dept Fax: [phone]        E. Lawernce House, MD Teddy Parr, MD Ahmed B. Johnney Ogles, MD Iline Gobble, MD Tito Schaumann, MD Zara Riley, CNP            1/12/2021      HISTORY OF PRESENT ILLNESS:       I had the pleasure of seeing Jaya Chung, who returns for continuing neurologic care. The patient was seen last on July 9, 2020 for treatment of idiopathic peripheral neuropathy and restless leg syndrome. For management of neuropathy he is currently prescribed lyrica 100 mg 3 times daily. He is here today reporting that he has been compliant with lyrica and reports that it has made a significant difference in his neuorpathy. He reports that it is currently well controlled and has no further complaints at today's visit. For management of restless leg syndrome he is currently prescribed tizanidine 4 mg at bedtime daily. He previously noticed that his restless legs began improving and he weaned himself off of tizanidine. He reports today that he has stopped taking the medication. There is also reported numbness in fingers 1, 2, and 3 on right hand. This has been present for approximately 1 year and he notes that some days are worse than other. He reports that the lyrica has been helping this. He has previously had carpal tunnel release on right. There is no associated neck pain. Testing reviewed:    vitamin B12 903, TSH 2.3, and hemoglobin A1c 5.3.            PAST MEDICAL HISTORY:         Diagnosis Date    Carpal tunnel syndrome     Hyperlipidemia     Hypertension     Neuropathy     Osteoarthritis     left hip         PAST SURGICAL HISTORY:         Procedure Laterality Date    CARPAL TUNNEL RELEASE      COLONOSCOPY      FINGER SURGERY      x2  INGUINAL HERNIA REPAIR      ROTATOR CUFF REPAIR      left    TOTAL HIP ARTHROPLASTY Left         SOCIAL HISTORY:     Social History     Socioeconomic History    Marital status: Single     Spouse name: Not on file    Number of children: Not on file    Years of education: Not on file    Highest education level: Not on file   Occupational History    Not on file   Social Needs    Financial resource strain: Not on file    Food insecurity     Worry: Not on file     Inability: Not on file    Transportation needs     Medical: Not on file     Non-medical: Not on file   Tobacco Use    Smoking status: Never Smoker    Smokeless tobacco: Never Used   Substance and Sexual Activity    Alcohol use: Yes     Alcohol/week: 0.0 standard drinks     Comment: rarely    Drug use: No    Sexual activity: Not on file   Lifestyle    Physical activity     Days per week: Not on file     Minutes per session: Not on file    Stress: Not on file   Relationships    Social connections     Talks on phone: Not on file     Gets together: Not on file     Attends Latter day service: Not on file     Active member of club or organization: Not on file     Attends meetings of clubs or organizations: Not on file     Relationship status: Not on file    Intimate partner violence     Fear of current or ex partner: Not on file     Emotionally abused: Not on file     Physically abused: Not on file     Forced sexual activity: Not on file   Other Topics Concern    Not on file   Social History Narrative    Not on file       CURRENT MEDICATIONS:     Current Outpatient Medications   Medication Sig Dispense Refill    metoprolol succinate (TOPROL XL) 25 MG extended release tablet TAKE 1 TABLET (25 MG TOTAL) BY MOUTH DAILY INDICATIONS  HIGH BLOOD PRESSURE.  pregabalin (LYRICA) 100 MG capsule Take 1 capsule by mouth 3 times daily for 90 days.  270 capsule 3    niacin 500 MG CR capsule Take 500 mg by mouth nightly Throat: Lips and tongue normal; teeth normal;  gums normal   Neck: Supple, intact flexion, extension and rotation;   trachea midline;  no adenopathy;   thyroid: not enlarged;   no carotid pulse abnormality   Back:   Symmetric, no curvature, ROM adequate   Lungs:   Respirations unlabored   Heart:  Regular rate and rhythm           Extremities: Extremities normal, no cyanosis, no edema   Pulses: Symmetric over head and neck   Skin: Skin color, texture normal, no rashes, no lesions                                     NEUROLOGIC EXAMINATION    Neurologic Exam  Mental status    Alert and oriented x 3; intact memory with no confusion, speech or language problems; no hallucinations or delusions  Fund of information appropriate for level of education    Cranial nerves    II - visual fields intact to confrontation bilaterally  III, IV, VI  extra-ocular muscles full: no pupillary defect; no GLORY, no nystagmus, no ptosis   V - normal facial sensation                                                               VII - normal facial symmetry                                                             VIII - intact hearing                                                                             IX, X - symmetrical palate                                                                  XI - symmetrical shoulder shrug                                                       XII - tongue midline without atrophy or fasciculation      Motor function  Normal muscle bulk and tone; strength 5/5 on all 4 extremities, no pronator drift      Sensory function Diminished light touch, pinprick, vibration, and temperature sensation in bilateral lower extremities to the mid calf        Cerebellar Intact fine motor movement. No involuntary movements or tremors. No ataxia or dysmetria on finger to nose or heel to shin testing      Reflex function DTR 2+ on bilateral UE and LE, symmetric.  Negative Babinski Gait                   normal base and arm swing                  Medical Decision Making: In summary, your patient, Caitlyn Herrera exhibits the following, with associated plan:    1. Idiopathic peripheral neuropathy  1. Continue Lyrica 100 mg 3 times daily  2. Numbness on fingers 1, 2, and 3 with previous history of right carpal tunnel release  1. Lyrica as above  2. Continue to monitor  3. Restless leg syndrome, currently resolved  1. Patient is to return on annual basis            Signed: Kim Armenta CNP      *Please note that portions of this note were completed with a voice recognition program.  Although every effort was made to insure the accuracy of this automated transcription, some errors in transcription may have occurred, occasionally words and are mis-transcribed    Provider Attestation:    *The documentation recorded by the scribe accurately reflects the service I personally performed and the decisions made by myself. Portions of this exam were transcribed by a scribe. I personally performed the history, physical exam, and the medical decision-making and confirm the accuracy of the information in the transcribed note. *       Scribe Attestation:     By signing my name below, I, Alex Benito, attest that this documentation has been prepared under the direction and in the presence of Kim Armenta CNP.

## 2021-03-12 DIAGNOSIS — G62.9 NEUROPATHY: ICD-10-CM

## 2021-03-12 NOTE — TELEPHONE ENCOUNTER
Hafsa De Jesus called the office today asking for Reyes Ku LPN. I explained that she was currently out of the office. Patient states that he had dropped of his [employer] form previously. Patient stated that it had been a couple weeks and he had not heard anything. He is concerned because he only has about 3 weeks left of his Lyrica. Patient was placed on hold and form was located. I let him know that I did see this information on Alondra's desk. I told him I would send her a message regarding his call. Patient verbally stated his understanding. ASSESSMENT/PLAN: 	    AKSHAT MESSINA is a 65y woman with significant history of COPD and current everyday smoking, seen in the ER for evaluation of palpitations, found to have atrial flutter with 2:1 AV conduction.  Now in SR  - The patient has been chest pain free since admission, without ischemic ECG abnormalities and negative initial troponin; doubt acute MI.  Moreover, the patient is s/p cardiac cath 03/2018 showing no evidence of obstructive coronary disease.   - No evidence of decompensated HF clinically.   - Echocardiogram from 03/2018 reviewed as above.    - Rhythm/hemodynamics: BP stable; patient remains in Sinus rhythm. Given underlying COPD will avoid beta blockers and amiodarone.  Will start flecainide 100 mg PO bid along with Cardizem 60 mg PO q6hr.  - Currently maintaining sinus rhythm, would anticipate discharge to home today with early outpatient follow up with Dr. Bustamante.   - Paroxsymal atrial flutter: CHADSVASC score appears to be 1 however as we are giving antiarrhythmics with the goal of converting to and maintaining sinus rhythm, after discussing risks, benefits, and alternatives, will start Eliquis 5 mg bid.   - Keep K > 4, Mg > 2

## 2021-03-16 RX ORDER — PREGABALIN 100 MG/1
100 CAPSULE ORAL 3 TIMES DAILY
Qty: 270 CAPSULE | Refills: 3 | Status: SHIPPED | OUTPATIENT
Start: 2021-03-16 | End: 2021-09-09

## 2021-09-08 DIAGNOSIS — G62.9 NEUROPATHY: ICD-10-CM

## 2021-09-09 NOTE — TELEPHONE ENCOUNTER
Pharmacy requesting refill of pregabalin. Medication active on med list yes      Date of last fill: 3/16/2190d  with 3 refills verified on 9/9/21   verified by EL MARI  Pregabalin is controlled and can only be refilled for 6 months, not one year    Date of last appointment 1/12/21    Next Visit Date:  1/11/2022      I called and lm for pt. to call us as this is a different pharmacy requesting refill.

## 2022-01-11 ENCOUNTER — OFFICE VISIT (OUTPATIENT)
Dept: NEUROLOGY | Age: 67
End: 2022-01-11
Payer: COMMERCIAL

## 2022-01-11 VITALS
HEART RATE: 90 BPM | HEIGHT: 70 IN | BODY MASS INDEX: 35.07 KG/M2 | DIASTOLIC BLOOD PRESSURE: 104 MMHG | SYSTOLIC BLOOD PRESSURE: 184 MMHG | WEIGHT: 245 LBS

## 2022-01-11 DIAGNOSIS — G62.9 NEUROPATHY: ICD-10-CM

## 2022-01-11 DIAGNOSIS — G56.01 CARPAL TUNNEL SYNDROME OF RIGHT WRIST: ICD-10-CM

## 2022-01-11 DIAGNOSIS — G25.81 RESTLESS LEG SYNDROME: Primary | ICD-10-CM

## 2022-01-11 PROCEDURE — 99214 OFFICE O/P EST MOD 30 MIN: CPT | Performed by: NURSE PRACTITIONER

## 2022-01-11 RX ORDER — PREGABALIN 100 MG/1
100 CAPSULE ORAL 3 TIMES DAILY
Qty: 90 CAPSULE | Refills: 11 | Status: SHIPPED | OUTPATIENT
Start: 2022-01-11 | End: 2022-08-10

## 2022-01-11 NOTE — PROGRESS NOTES
[facility]            [place], [address]          [place], [address] [facility]          Dept: [phone]          Dept Fax: [phone]    MD Henrry Christopher MD Ahmed B. Halford Decree, MD Gilmer Butte, MD Norval Billing, CNP            1/11/2022      HISTORY OF PRESENT ILLNESS:       I had the pleasure of seeing Lonnie Triplett, who returns for continuing neurologic care. The patient was seen last on January 12, 2021 for treatment of idiopathic peripheral neuropathy, numbness of fingers 1, 2 and 3 and restless leg syndrome. For management of his idiopathic peripheral neuropathy he is prescribed lyrica 100 mg three times daily. He is here today reporting that he has remained compliant to lyrica which is effective in managing his neuropathy. He denies any side effects from lyrica. For management of the numbness in fingers 1, 2 and 3 with a previous history of right carpal tunnel release he is prescribed lyrica 100 mg three times daily. The patient also has a history of restless leg syndrome which was resolved at his last visit. His restless legs have remained resolved prior to today's visit.          Testing reviewed:      vitamin B12 903, TSH 2.3, and hemoglobin A1c 5.3.     PAST MEDICAL HISTORY:         Diagnosis Date    Carpal tunnel syndrome     Hyperlipidemia     Hypertension     Neuropathy     Osteoarthritis     left hip         PAST SURGICAL HISTORY:         Procedure Laterality Date    CARPAL TUNNEL RELEASE      COLONOSCOPY      FINGER SURGERY      x2    INGUINAL HERNIA REPAIR      ROTATOR CUFF REPAIR      left    TOTAL HIP ARTHROPLASTY Left         SOCIAL HISTORY:     Social History     Socioeconomic History    Marital status: Single     Spouse name: Not on file    Number of children: Not on file    Years of education: Not on file    Highest education level: Not on file   Occupational History    Not on file   Tobacco Use    Smoking status: Never Smoker    Smokeless tobacco: Never Used   Vaping Use    Vaping Use: Never used   Substance and Sexual Activity    Alcohol use: Yes     Alcohol/week: 0.0 standard drinks     Comment: rarely    Drug use: No    Sexual activity: Not on file   Other Topics Concern    Not on file   Social History Narrative    Not on file     Social Determinants of Health     Financial Resource Strain:     Difficulty of Paying Living Expenses: Not on file   Food Insecurity:     Worried About Running Out of Food in the Last Year: Not on file    Roseann of Food in the Last Year: Not on file   Transportation Needs:     Lack of Transportation (Medical): Not on file    Lack of Transportation (Non-Medical): Not on file   Physical Activity:     Days of Exercise per Week: Not on file    Minutes of Exercise per Session: Not on file   Stress:     Feeling of Stress : Not on file   Social Connections:     Frequency of Communication with Friends and Family: Not on file    Frequency of Social Gatherings with Friends and Family: Not on file    Attends Confucianism Services: Not on file    Active Member of [address] [place] [address] or Organizations: Not on file    Attends Club or Organization Meetings: Not on file    Marital Status: Not on file   Intimate Partner Violence:     Fear of Current or Ex-Partner: Not on file    Emotionally Abused: Not on file    Physically Abused: Not on file    Sexually Abused: Not on file   Housing Stability:     Unable to Pay for Housing in the Last Year: Not on file    Number of [place] in the Last Year: Not on file    Unstable Housing in the Last Year: Not on file       CURRENT MEDICATIONS:     Current Outpatient Medications   Medication Sig Dispense Refill    pregabalin (LYRICA) 100 MG capsule Take 1 capsule by mouth 3 times daily for 30 days.  90 capsule 11    metoprolol succinate (TOPROL XL) 25 MG extended release tablet TAKE 1 TABLET (25 MG TOTAL) BY MOUTH DAILY INDICATIONS HIGH BLOOD PRESSURE.  niacin 500 MG CR capsule Take 500 mg by mouth nightly       No current facility-administered medications for this visit. ALLERGIES:   No Known Allergies                              REVIEW OF SYSTEMS        All items selected indicate a positive finding. Those items not selected are negative.   Constitutional [] Weight loss/gain   [] Fatigue  [] Fever/Chills   HEENT [] Hearing Loss  [] Visual Disturbance  [] Tinnitus  [] Eye pain   Respiratory [] Shortness of Breath  [] Cough  [] Snoring   Cardiovascular [] Chest Pain  [] Palpitations  [] Lightheaded   GI [] Constipation  [] Diarrhea  [] Swallowing change  [] Nausea/vomiting    [] Urinary Frequency  [] Urinary Urgency   Musculoskeletal [] Neck pain  [] Back pain  [] Muscle pain  [] Restless legs   Dermatologic [] Skin changes   Neurologic [] Memory loss/confusion  [] Seizures  [] Trouble walking or imbalance  [] Dizziness  [] Sleep disturbance  [] Weakness  [x] Numbness  [] Tremors  [] Speech Difficulty  [] Headaches  [] Light Sensitivity  [] Sound Sensitivity   Endocrinology []Excessive thirst  []Excessive hunger   Psychiatric [] Anxiety/Depression  [] Hallucination   Allergy/immunology []Hives/environmental allergies   Hematologic/lymph [] Abnormal bleeding  [] Abnormal bruising         PHYSICAL EXAMINATION:       Vitals:    01/11/22 0927   BP: (!) 184/104   Pulse: 90                                              .                                                                                                    General Appearance:  Alert, cooperative, no signs of distress, appears stated age   Head:  Normocephalic, no signs of trauma   Eyes:  Conjunctiva/corneas clear;  eyelids intact   Ears:  Normal external ear and canals   Nose: Nares normal, mucosa normal, no drainage    Throat: Lips and tongue normal; teeth normal;  gums normal   Neck: Supple, intact flexion, extension and rotation;   trachea midline;  no adenopathy; thyroid: not enlarged;   no carotid pulse abnormality   Back:   Symmetric, no curvature, ROM adequate   Lungs:   Respirations unlabored   Heart:  Regular rate and rhythm           Extremities: Extremities normal, no cyanosis, no edema   Pulses: Symmetric over head and neck   Skin: Skin color, texture normal, no rashes, no lesions                                     NEUROLOGIC EXAMINATION    Neurologic Exam  Mental status    Alert and oriented x 3; intact memory with no confusion, speech or language problems; no hallucinations or delusions  Fund of information appropriate for level of education    Cranial nerves    II - visual fields intact to confrontation bilaterally  III, IV, VI - extra-ocular muscles full: no pupillary defect; no GLORY, no nystagmus, no ptosis   V - normal facial sensation                                                               VII - normal facial symmetry                                                             VIII - intact hearing                                                                             IX, X - symmetrical palate                                                                  XI - symmetrical shoulder shrug                                                       XII - tongue midline without atrophy or fasciculation      Motor function  Normal muscle bulk and tone; strength 5/5 on all 4 extremities, no pronator drift      Sensory function 80% diminished vibration, temperature and pinprick sensation to the ankles in a bilateral glove stocking distribution   Cerebellar Intact fine motor movement. No involuntary movements or tremors. No ataxia or dysmetria on finger to nose or heel to shin testing      Reflex function DTR 2+ on bilateral UE and LE, symmetric. Down going toes bilaterally      Gait                   normal base and arm swing                  Medical Decision Making:        In summary, your patient, Onel Talavera exhibits the following, with associated plan:    1. Idiopathic peripheral neuropathy with 80% diminished vibration, temperature and pinprick sensation to the bilateral ankles in a glove stocking distribution  1. Continue Lyrica 100 mg 3 times daily  2. Numbness on fingers 1, 2, and 3 with previous history of right carpal tunnel release  1. Lyrica as above  2. Continue to monitor  3. Restless leg syndrome, currently resolved  1. Continue to return on annual basis              Signed: Cristhian Del VallerMADDIE      *Please note that portions of this note were completed with a voice recognition program.  Although every effort was made to insure the accuracy of this automated transcription, some errors in transcription may have occurred, occasionally words and are mis-transcribed    Provider Attestation: The documentation recorded by the scribe accurately reflects the service I personally performed and the decisions made by myself. Portions of this note were transcribed by a scribe. I personally performed the history, physical exam, and medical decision-making and confirm the accuracy of the information in the transcribed note. Scribe Attestation:   By signing my name below, Tahir Alonso, attest that this documentation has been prepared under the direction and in the presence of Cristhian Bond CNP.

## 2022-08-09 DIAGNOSIS — G62.9 NEUROPATHY: ICD-10-CM

## 2022-08-10 RX ORDER — PREGABALIN 100 MG/1
CAPSULE ORAL
Qty: 90 CAPSULE | Refills: 4 | Status: SHIPPED | OUTPATIENT
Start: 2022-08-10 | End: 2023-01-07

## 2022-08-10 NOTE — TELEPHONE ENCOUNTER
Pharmacy requesting refill of Pregabalin. Previous Rx given on 1/11/22 for a month supply and 11 refills. Rx is controlled and can only allow for a six month script.         Medication active on med list yes      Date of last fill: 1/11/22 for #90 and 11 refills  verified on 8/10/2022    verified by Connor Randolph LPN      Date of last appointment: 1/11/2022    Next Visit Date:  1/11/2023

## 2023-01-11 ENCOUNTER — OFFICE VISIT (OUTPATIENT)
Dept: NEUROLOGY | Age: 68
End: 2023-01-11
Payer: COMMERCIAL

## 2023-01-11 VITALS
SYSTOLIC BLOOD PRESSURE: 154 MMHG | BODY MASS INDEX: 35.79 KG/M2 | DIASTOLIC BLOOD PRESSURE: 90 MMHG | HEIGHT: 70 IN | WEIGHT: 250 LBS | HEART RATE: 85 BPM

## 2023-01-11 DIAGNOSIS — G56.01 CARPAL TUNNEL SYNDROME OF RIGHT WRIST: Primary | ICD-10-CM

## 2023-01-11 DIAGNOSIS — G62.9 NEUROPATHY: ICD-10-CM

## 2023-01-11 DIAGNOSIS — G25.81 RESTLESS LEG SYNDROME: ICD-10-CM

## 2023-01-11 PROCEDURE — 1123F ACP DISCUSS/DSCN MKR DOCD: CPT | Performed by: NURSE PRACTITIONER

## 2023-01-11 PROCEDURE — 99214 OFFICE O/P EST MOD 30 MIN: CPT | Performed by: NURSE PRACTITIONER

## 2023-01-11 RX ORDER — LISINOPRIL 2.5 MG/1
TABLET ORAL
COMMUNITY
Start: 2022-10-17

## 2023-01-11 RX ORDER — PREGABALIN 100 MG/1
100 CAPSULE ORAL 4 TIMES DAILY
Qty: 120 CAPSULE | Refills: 5 | Status: SHIPPED | OUTPATIENT
Start: 2023-01-11 | End: 2023-02-10

## 2023-01-11 NOTE — PROGRESS NOTES
yr             [facility]            [address]          [place], [address] [facility]          Dept: [phone]          Dept Fax: [phone]    MD Abraham Wellington MD Nadia Boot, MD Bobbie Dimmer, MADDIE            1/11/2023      HISTORY OF PRESENT ILLNESS:       I had the pleasure of seeing Judy Hernandez, who returns for continuing neurologic care. The patient was seen last on January 11, 2022 for treatment of idiopathic peripheral neuropathy, numbness of the fingers and restless leg syndrome    For management of his idiopathic peripheral neuropathy, he is prescribed Lyrica 100 mg 3 times daily. Today, he states that the dosage may need to be adjusted. He has taken 4 Lyrica and a day on occasion and noticed a significant improvement in his symptoms. The patient has also had 2 isolated episodes where he felt a shooting sensation starting in his foot and going up his his leg followed by an erection. On both occasions, he was walking outdoors. The patient also has numbness in the first second and third finger of his right hand. He has a history of right carpal tunnel release. Lyrica also helps to relieve the symptoms. The patient also has a history of restless leg syndrome, but his symptoms are no longer present at his last visit. Kelly Garner         Testing reviewed:    vitamin B12 903, TSH 2.3, and hemoglobin A1c 5.3      PAST MEDICAL HISTORY:         Diagnosis Date    Carpal tunnel syndrome     Hyperlipidemia     Hypertension     Neuropathy     Osteoarthritis     left hip         PAST SURGICAL HISTORY:         Procedure Laterality Date    CARPAL TUNNEL RELEASE      COLONOSCOPY      FINGER SURGERY      x2    INGUINAL HERNIA REPAIR      ROTATOR CUFF REPAIR      left    TOTAL HIP ARTHROPLASTY Left         SOCIAL HISTORY:     Social History     Socioeconomic History    Marital status: Single     Spouse name: Not on file    Number of children: Not on file    Years of education: Not on file    Highest education level: Not on file   Occupational History    Not on file   Tobacco Use    Smoking status: Never    Smokeless tobacco: Never   Vaping Use    Vaping Use: Never used   Substance and Sexual Activity    Alcohol use: Yes     Alcohol/week: 0.0 standard drinks     Comment: rarely    Drug use: No    Sexual activity: Not on file   Other Topics Concern    Not on file   Social History Narrative    Not on file     Social Determinants of Health     Financial Resource Strain: Not on file   Food Insecurity: Not on file   Transportation Needs: Not on file   Physical Activity: Not on file   Stress: Not on file   Social Connections: Not on file   Intimate Partner Violence: Not on file   Housing Stability: Not on file       CURRENT MEDICATIONS:     Current Outpatient Medications   Medication Sig Dispense Refill    pregabalin (LYRICA) 100 MG capsule TAKE 1 CAPSULE BY MOUTH THREE TIMES A DAY FOR 30 DAYS 90 capsule 4    metoprolol succinate (TOPROL XL) 25 MG extended release tablet TAKE 1 TABLET (25 MG TOTAL) BY MOUTH DAILY INDICATIONS  HIGH BLOOD PRESSURE.      niacin 500 MG CR capsule Take 500 mg by mouth nightly       No current facility-administered medications for this visit. ALLERGIES:   No Known Allergies                              REVIEW OF SYSTEMS        All items selected indicate a positive finding. Those items not selected are negative.   Constitutional [] Weight loss/gain   [] Fatigue  [] Fever/Chills   HEENT [] Hearing Loss  [] Visual Disturbance  [] Tinnitus  [] Eye pain   Respiratory [] Shortness of Breath  [] Cough  [] Snoring   Cardiovascular [] Chest Pain  [] Palpitations  [] Lightheaded   GI [] Constipation  [] Diarrhea  [] Swallowing change  [] Nausea/vomiting    [] Urinary Frequency  [] Urinary Urgency   Musculoskeletal [] Neck pain  [] Back pain  [] Muscle pain  [] Restless legs   Dermatologic [] Skin changes   Neurologic [] Memory loss/confusion  [] Seizures  [] Trouble walking or imbalance  [] Dizziness  [] Sleep disturbance  [] Weakness  [] Numbness  [] Tremors  [] Speech Difficulty  [] Headaches  [] Light Sensitivity  [] Sound Sensitivity   Endocrinology []Excessive thirst  []Excessive hunger   Psychiatric [] Anxiety/Depression  [] Hallucination   Allergy/immunology []Hives/environmental allergies   Hematologic/lymph [] Abnormal bleeding  [] Abnormal bruising         PHYSICAL EXAMINATION:       There were no vitals filed for this visit.                                            .                                                                                                    General Appearance:  Alert, cooperative, no signs of distress, appears stated age   Head:  Normocephalic, no signs of trauma   Eyes:  Conjunctiva/corneas clear;  eyelids intact   Ears:  Normal external ear and canals   Nose: Nares normal, mucosa normal, no drainage    Throat: Lips and tongue normal; teeth normal;  gums normal   Neck: Supple, intact flexion, extension and rotation;   trachea midline;  no adenopathy;   thyroid: not enlarged;   no carotid pulse abnormality   Back:   Symmetric, no curvature, ROM adequate   Lungs:   Respirations unlabored   Heart:  Regular rate and rhythm           Extremities: Extremities normal, no cyanosis, no edema   Pulses: Symmetric over head and neck   Skin: Skin color, texture normal, no rashes, no lesions                                     NEUROLOGIC EXAMINATION    Neurologic Exam  Mental status    Alert and oriented x 3; intact memory with no confusion, speech or language problems; no hallucinations or delusions  Fund of information appropriate for level of education    Cranial nerves    II - visual fields intact to confrontation bilaterally  III, IV, VI - extra-ocular muscles full: no pupillary defect; no GLORY, no nystagmus, no ptosis   V - normal facial sensation VII - normal facial symmetry                                                             VIII - intact hearing                                                                             IX, X - symmetrical palate                                                                  XI - symmetrical shoulder shrug                                                       XII - tongue midline without atrophy or fasciculation      Motor function  Normal muscle bulk and tone; strength 5/5 on all 4 extremities, no pronator drift      Sensory function Intact to light touch, pinprick, vibration, proprioception on all 4 extremities      Cerebellar Intact fine motor movement. No involuntary movements or tremors. No ataxia or dysmetria on finger to nose or heel to shin testing      Reflex function DTR 2+ on bilateral UE and LE, symmetric. Down going toes bilaterally      Gait                   normal base and arm swing                  Medical Decision Making:        In summary, your patient, Jason Mg exhibits the following, with associated plan:    Idiopathic peripheral neuropathy, with increased discomfort in his bilateral lower extremities on his current dose of Lyrica  Continue Lyrica, but increase to 100 mg 4 times daily  Continue to follow on an annual basis  Right hand finger numbness with previous history of right carpal tunnel release  Continue Lyrica as above            Signed: Kate Lopez, MADDIE      *Please note that portions of this note were completed with a voice recognition program.  Although every effort was made to insure the accuracy of this automated transcription, some errors in transcription may have occurred, occasionally words and are mis-transcribed

## 2023-02-13 DIAGNOSIS — G62.9 NEUROPATHY: ICD-10-CM

## 2023-02-13 NOTE — TELEPHONE ENCOUNTER
Patient calling for refill of Pregabalin 100mg.   (Pervious script ended after 1 month)      Medication active on med list yes      Date of last fill: 01/11/2023    verified on 2/13/2023     verified by [facility] LPN      Date of last appointment 01/11/2023    Next Visit Date:  7/11/2023

## 2023-02-14 RX ORDER — PREGABALIN 100 MG/1
100 CAPSULE ORAL 4 TIMES DAILY
Qty: 120 CAPSULE | Refills: 5 | Status: SHIPPED | OUTPATIENT
Start: 2023-02-14 | End: 2023-08-14

## 2023-07-11 ENCOUNTER — OFFICE VISIT (OUTPATIENT)
Dept: NEUROLOGY | Age: 68
End: 2023-07-11
Payer: COMMERCIAL

## 2023-07-11 VITALS
HEIGHT: 70 IN | SYSTOLIC BLOOD PRESSURE: 147 MMHG | DIASTOLIC BLOOD PRESSURE: 83 MMHG | BODY MASS INDEX: 35.65 KG/M2 | WEIGHT: 249 LBS | HEART RATE: 78 BPM

## 2023-07-11 DIAGNOSIS — G62.9 NEUROPATHY: ICD-10-CM

## 2023-07-11 PROCEDURE — 99215 OFFICE O/P EST HI 40 MIN: CPT | Performed by: STUDENT IN AN ORGANIZED HEALTH CARE EDUCATION/TRAINING PROGRAM

## 2023-07-11 PROCEDURE — 1123F ACP DISCUSS/DSCN MKR DOCD: CPT | Performed by: STUDENT IN AN ORGANIZED HEALTH CARE EDUCATION/TRAINING PROGRAM

## 2023-07-11 RX ORDER — PREGABALIN 100 MG/1
100 CAPSULE ORAL 3 TIMES DAILY
Qty: 270 CAPSULE | Refills: 2 | Status: SHIPPED | OUTPATIENT
Start: 2023-07-11 | End: 2024-01-08

## 2023-07-11 NOTE — PROGRESS NOTES
[facility]            [address] [facility], [address] [place] [address].          [facility], [address]          Dept: [phone]          Dept Fax: [phone]             7/11/2023      HISTORY OF PRESENT ILLNESS:       I had the pleasure of seeing Renea Renee, who returns for continuing neurologic care. The patient was seen last on January 11, 2023 for treatment of idiopathic peripheral neuropathy, numbness of the fingers and restless leg syndrome    For management of his idiopathic peripheral neuropathy, he is prescribed Lyrica 100 mg 4 times daily. He reports compliance and notes it has improved his neuropathy. He notes symptoms are significantly improved in the summer time so he would like to decrease to Lyrica 100 mg TID. The patient also has numbness in the first second and third finger of his right hand. He has a history of right carpal tunnel release. Lyrica also helps to relieve the symptoms. He notes symptoms get worse if he drills or uses his hands a lot. Testing reviewed:    vitamin B12 903, TSH 2.3, and hemoglobin A1c 5.3      PAST MEDICAL HISTORY:         Diagnosis Date    Carpal tunnel syndrome     Hyperlipidemia     Hypertension     Neuropathy     Osteoarthritis     left hip         PAST SURGICAL HISTORY:         Procedure Laterality Date    CARPAL TUNNEL RELEASE      COLONOSCOPY      FINGER SURGERY      x2    INGUINAL HERNIA REPAIR      ROTATOR CUFF REPAIR      left    TOTAL HIP ARTHROPLASTY Left         SOCIAL HISTORY:     Social History     Socioeconomic History    Marital status: Single     Spouse name: Not on file    Number of children: Not on file    Years of education: Not on file    Highest education level: Not on file   Occupational History    Not on file   Tobacco Use    Smoking status: Never    Smokeless tobacco: Never   Vaping Use    Vaping Use: Never used   Substance and Sexual Activity    Alcohol use:  Yes     Alcohol/week: 0.0 standard drinks

## 2024-01-18 ENCOUNTER — OFFICE VISIT (OUTPATIENT)
Dept: NEUROLOGY | Age: 69
End: 2024-01-18
Payer: COMMERCIAL

## 2024-01-18 VITALS
HEART RATE: 75 BPM | HEIGHT: 70 IN | SYSTOLIC BLOOD PRESSURE: 140 MMHG | DIASTOLIC BLOOD PRESSURE: 82 MMHG | WEIGHT: 259 LBS | BODY MASS INDEX: 37.08 KG/M2

## 2024-01-18 DIAGNOSIS — G62.9 NEUROPATHY: ICD-10-CM

## 2024-01-18 PROCEDURE — 99214 OFFICE O/P EST MOD 30 MIN: CPT | Performed by: STUDENT IN AN ORGANIZED HEALTH CARE EDUCATION/TRAINING PROGRAM

## 2024-01-18 PROCEDURE — 1123F ACP DISCUSS/DSCN MKR DOCD: CPT | Performed by: STUDENT IN AN ORGANIZED HEALTH CARE EDUCATION/TRAINING PROGRAM

## 2024-01-18 RX ORDER — CELECOXIB 200 MG/1
200 CAPSULE ORAL DAILY
COMMUNITY
Start: 2023-09-28

## 2024-01-18 RX ORDER — PREGABALIN 100 MG/1
100 CAPSULE ORAL 3 TIMES DAILY
Qty: 270 CAPSULE | Refills: 2 | Status: SHIPPED | OUTPATIENT
Start: 2024-01-18 | End: 2024-07-17

## 2024-01-18 RX ORDER — CHOLECALCIFEROL (VITAMIN D3) 125 MCG
1 CAPSULE ORAL DAILY
COMMUNITY

## 2024-01-18 NOTE — PROGRESS NOTES
[facility]            [address], [address]          [place], [place] [address]          Dept: [phone]          Dept Fax: [phone]             1/18/2024      HISTORY OF PRESENT ILLNESS:       I had the pleasure of seeing Shon Arango, who returns for treatment of idiopathic peripheral neuropathy, numbness of the fingers and restless leg syndrome    For management of his idiopathic peripheral neuropathy, he is prescribed Lyrica 100 mg 3 times daily. He reports compliance and notes it has improved his neuropathy. He notes neuropathy gets worse in the extreme cold weather.     The patient also has numbness in the first,second and third finger of his right hand.  He has a history of right carpal tunnel release.  Lyrica also helps to relieve the symptoms. He notes symptoms get worse if he drills or uses his hands a lot.         Testing reviewed:    vitamin B12 903, TSH 2.3, and hemoglobin A1c 5.3      PAST MEDICAL HISTORY:         Diagnosis Date    Carpal tunnel syndrome     Hyperlipidemia     Hypertension     Neuropathy     Osteoarthritis     left hip         PAST SURGICAL HISTORY:         Procedure Laterality Date    CARPAL TUNNEL RELEASE      COLONOSCOPY      FINGER SURGERY      x2    INGUINAL HERNIA REPAIR      ROTATOR CUFF REPAIR      left    TOTAL HIP ARTHROPLASTY Left         SOCIAL HISTORY:     Social History     Socioeconomic History    Marital status: Single     Spouse name: Not on file    Number of children: Not on file    Years of education: Not on file    Highest education level: Not on file   Occupational History    Not on file   Tobacco Use    Smoking status: Never    Smokeless tobacco: Never   Vaping Use    Vaping Use: Never used   Substance and Sexual Activity    Alcohol use: Yes     Alcohol/week: 0.0 standard drinks of alcohol     Comment: rarely    Drug use: No    Sexual activity: Not on file   Other Topics Concern    Not on file   Social History

## 2024-06-12 ENCOUNTER — OFFICE VISIT (OUTPATIENT)
Dept: NEUROLOGY | Age: 69
End: 2024-06-12
Payer: COMMERCIAL

## 2024-06-12 VITALS
BODY MASS INDEX: 35.79 KG/M2 | DIASTOLIC BLOOD PRESSURE: 79 MMHG | HEART RATE: 74 BPM | SYSTOLIC BLOOD PRESSURE: 132 MMHG | WEIGHT: 250 LBS | HEIGHT: 70 IN

## 2024-06-12 DIAGNOSIS — G62.9 NEUROPATHY: Primary | ICD-10-CM

## 2024-06-12 PROCEDURE — 1123F ACP DISCUSS/DSCN MKR DOCD: CPT | Performed by: STUDENT IN AN ORGANIZED HEALTH CARE EDUCATION/TRAINING PROGRAM

## 2024-06-12 PROCEDURE — 99214 OFFICE O/P EST MOD 30 MIN: CPT | Performed by: STUDENT IN AN ORGANIZED HEALTH CARE EDUCATION/TRAINING PROGRAM

## 2024-06-12 RX ORDER — ROSUVASTATIN CALCIUM 10 MG/1
10 TABLET, COATED ORAL NIGHTLY
COMMUNITY
Start: 2024-05-29

## 2024-06-12 NOTE — PROGRESS NOTES
normal facial symmetry                                                             VIII - intact hearing                                                                             IX, X - symmetrical palate                                                                  XI - symmetrical shoulder shrug                                                       XII - tongue midline without atrophy or fasciculation      Motor function  Normal muscle bulk and tone; strength 5/5 on all 4 extremities, no pronator drift      Sensory function Decreased sensation to mid calf bilaterally      Cerebellar Intact fine motor movement. No involuntary movements or tremors. No ataxia or dysmetria on finger to nose or heel to shin testing      Reflex function DTR 2+ on bilateral UE , 2+ at knee and unable to elicit at ankle.    Gait                  Wide based gait, antalgic gait                  Medical Decision Making:   This is a 68-year-old patient who presents for follow-up for idiopathic peripheral neuropathy as well as carpal tunnel.    Idiopathic peripheral neuropathy,  A.Continue lyrica 100 mg TID. Previously tried gabapentin and Cymbalta  B. Continue ibuprofen for breakthrough pain    Right hand finger numbness with previous history of right carpal tunnel release  A.Continue Lyrica 100 mg TID      Kori Sanford MD   Neurology & Sleep Medicine  [facility]

## 2024-07-24 DIAGNOSIS — G62.9 NEUROPATHY: ICD-10-CM

## 2024-07-24 RX ORDER — PREGABALIN 100 MG/1
100 CAPSULE ORAL 3 TIMES DAILY
Qty: 270 CAPSULE | Refills: 1 | Status: SHIPPED | OUTPATIENT
Start: 2024-07-24 | End: 2025-01-21

## 2024-07-24 NOTE — TELEPHONE ENCOUNTER
Patient called into office requesting Lyrica refill. Pharmacy confirmed: [facility] in [place], [place]. Nurse notified.

## 2024-12-03 ENCOUNTER — OFFICE VISIT (OUTPATIENT)
Dept: NEUROLOGY | Age: 69
End: 2024-12-03
Payer: COMMERCIAL

## 2024-12-03 VITALS
WEIGHT: 256.2 LBS | SYSTOLIC BLOOD PRESSURE: 151 MMHG | HEIGHT: 70 IN | DIASTOLIC BLOOD PRESSURE: 76 MMHG | HEART RATE: 80 BPM | BODY MASS INDEX: 36.68 KG/M2

## 2024-12-03 DIAGNOSIS — G62.9 NEUROPATHY: Primary | ICD-10-CM

## 2024-12-03 PROCEDURE — 99214 OFFICE O/P EST MOD 30 MIN: CPT | Performed by: STUDENT IN AN ORGANIZED HEALTH CARE EDUCATION/TRAINING PROGRAM

## 2024-12-03 PROCEDURE — 1123F ACP DISCUSS/DSCN MKR DOCD: CPT | Performed by: STUDENT IN AN ORGANIZED HEALTH CARE EDUCATION/TRAINING PROGRAM

## 2024-12-03 RX ORDER — PREGABALIN 100 MG/1
100 CAPSULE ORAL 3 TIMES DAILY
Qty: 270 CAPSULE | Refills: 1 | Status: SHIPPED | OUTPATIENT
Start: 2024-12-03 | End: 2025-06-02

## 2024-12-03 NOTE — PROGRESS NOTES
nightly  Patient not taking: Reported on 12/3/2024 5/29/24   Provider, MD Isac       ALLERGIES:   Allergies   Allergen Reactions    Statins      Myalgia         ROS:  The patient did not report additional concerns. There were no symptoms suggestive of cardiac, gastrointestinal, or endocrinal dysfunction.   No abnormal skin findings.  No complaints of headaches or visual disturbances.   No symptoms suggestive of respiratory, genitourinary or muskuloskeletal dysfunction.   No concerns of emotional disturbances.        Physical Exam:  BP (!) 151/76 (Site: Right Upper Arm, Position: Sitting, Cuff Size: Large Adult)   Pulse 80   Ht 1.778 m (5' 10\")   Wt 116.2 kg (256 lb 3.2 oz)   BMI 36.76 kg/m²   General Appearance: In no acute distress  Head: \"Normocephalic, without obvious abnormality\",\"atraumatic\"    Mental Status: Orientation oriented to person, place, problem, and time.   Mood/Affect: appropriate mood and affect.  Fund of Knowledge: current events and past history.    Cranial Nerves:   CN II: pupil normal size and reactive to light and dark and visual acuity intact to confrontation.   CN III, IV, VI: no nystagmus, extraocular muscle strength normal, and normal ocular motility pursuits.   CN V : normal sensation.  CN VII : normal facial expression.  CN VIII : normal hearing to finger rub.   CN IX, X: normal palatal movement.   CN XI : normal trapezii.   CN XII: tongue protrudes midline.    Motor Exam:  Right Upper Extremity: 5/5 strength, no drift, normal bulk and tone. Left Upper Extremity: 5/5 strength. no drift, normal bulk and tone.   Right Lower Extremity: 5/5 strength, no drift, normal bulk and tone. Left Lower Extremity:5/5 strength, no drift, normal bulk and tone      Reflexes:        R          L  B          2          2  T          2          2  BR       2          2  P          2          2  A        0            0  Coordination: normal finger-to-nose     Sensation: Decree sensation to pinprick

## 2025-06-17 ENCOUNTER — OFFICE VISIT (OUTPATIENT)
Dept: NEUROLOGY | Age: 70
End: 2025-06-17
Payer: COMMERCIAL

## 2025-06-17 VITALS
WEIGHT: 238.4 LBS | HEIGHT: 70 IN | HEART RATE: 69 BPM | SYSTOLIC BLOOD PRESSURE: 141 MMHG | DIASTOLIC BLOOD PRESSURE: 87 MMHG | BODY MASS INDEX: 34.13 KG/M2

## 2025-06-17 DIAGNOSIS — G62.9 NEUROPATHY: Primary | ICD-10-CM

## 2025-06-17 DIAGNOSIS — E53.8 B12 DEFICIENCY: ICD-10-CM

## 2025-06-17 DIAGNOSIS — R76.8 ANA POSITIVE: ICD-10-CM

## 2025-06-17 PROCEDURE — 99214 OFFICE O/P EST MOD 30 MIN: CPT | Performed by: STUDENT IN AN ORGANIZED HEALTH CARE EDUCATION/TRAINING PROGRAM

## 2025-06-17 PROCEDURE — 1123F ACP DISCUSS/DSCN MKR DOCD: CPT | Performed by: STUDENT IN AN ORGANIZED HEALTH CARE EDUCATION/TRAINING PROGRAM

## 2025-06-17 NOTE — PROGRESS NOTES
Neurology Clinic Note    [facility]  Department of Neurology  Date of Service: 6/17/2025 at 10:28 AM      CLINIC NOTE - INITIAL VISIT    Shon Arango is a 69 y.o. right handed male who came for follow-up of small fiber neuropathy, restless leg syndrome.  Patient is unaccompanied.    PRESENTING ILLNESS:      Shon presents for follow-up of chronic neuropathic pain primarily affecting the feet, with intermittent numbness in the fingers.  He describes his foot pain as variable in intensity, with bad days requiring use of both pregabalin (Lyrica) and NSAIDs (Motrin). The pain is most prominent from the mid-calf level downward.  He endorses occasional numbness in the fingers as well.  Laboratory results reveal a significantly low vitamin B12 level, which may be contributing to neuropathic symptoms.   Autoimmune screening panel showed a positive ADRIAN though other autoimmune markers were negative. Given the unclear significance of the isolated ADRIAN positivity, referral to rheumatology was discussed and agreed upon for further interpretation and work-up.  He has a documented history of small fiber neuropathy, previously confirmed by skin biopsy. There is no record of recent formal nerve conduction studies or EMG. He does not recall having one, and a study was recommended to better characterize the neuropathy, evaluate the extent of large fiber involvement, and establish a baseline.    He has history of right carpal tunnel release.  He was a .  He does not work anymore.    PREVIOUS EVALUATION:    Skin punch biopsy 4/2012:      PAST MEDICAL HISTORY:  Past Medical History:   Diagnosis Date    Carpal tunnel syndrome     Hyperlipidemia     Hypertension     Neuropathy     Osteoarthritis     left hip          Surgical History:   Past Surgical History:   Procedure Laterality Date    CARPAL TUNNEL RELEASE      COLONOSCOPY  2022    COLONOSCOPY  2017    COLONOSCOPY  2012    FINGER SURGERY      x2

## 2025-07-18 DIAGNOSIS — G62.9 NEUROPATHY: ICD-10-CM

## 2025-07-18 RX ORDER — PREGABALIN 100 MG/1
100 CAPSULE ORAL 3 TIMES DAILY
Qty: 270 CAPSULE | Refills: 1 | Status: SHIPPED | OUTPATIENT
Start: 2025-07-18 | End: 2026-01-14

## 2025-07-18 NOTE — TELEPHONE ENCOUNTER
LVN from 6/17/25 - \"Continue Lyrica 75 mg BID\" There is no script for 75mg. Last script was 100mg TID. Please advise which dose needs refilled.    Pharmacy requesting refill of Lyrica 100mg TID.      Medication active on med list yes      Date of last fill: 12/3/24 #270 R-1  verified on 7/18/2025   verified by VS LPN      Date of last appointment 6/17/25    Next Visit Date:  12/2/2025    Writer unable to review [facility]